# Patient Record
Sex: MALE | Race: WHITE | URBAN - METROPOLITAN AREA
[De-identification: names, ages, dates, MRNs, and addresses within clinical notes are randomized per-mention and may not be internally consistent; named-entity substitution may affect disease eponyms.]

---
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
## 2022-03-30 ENCOUNTER — TELEPHONE (OUTPATIENT)
Dept: FAMILY MEDICINE CLINIC | Facility: CLINIC | Age: 22
End: 2022-03-30

## 2022-03-31 NOTE — TELEPHONE ENCOUNTER
Pt returned nurse call. Pt made appt for 04/28/2022 since that was the only day and time that worked for him.

## 2022-03-31 NOTE — TELEPHONE ENCOUNTER
Please call for follow-up appointment this week for enterocolitis at [place]. Patient has not been seen since 2016. Verify with patient if not new PCP. Otherwise needs follow-up ER appointment.     Thank you

## 2022-04-19 PROBLEM — R11.2 NON-INTRACTABLE VOMITING WITH NAUSEA: Status: ACTIVE | Noted: 2017-03-06

## 2022-04-19 PROBLEM — R10.31 RIGHT LOWER QUADRANT ABDOMINAL PAIN: Status: ACTIVE | Noted: 2019-02-04

## 2022-04-19 PROBLEM — J06.9 VIRAL URI WITH COUGH: Status: ACTIVE | Noted: 2019-02-04

## 2022-04-19 PROBLEM — T07.XXXA MULTIPLE LACERATIONS: Status: ACTIVE | Noted: 2017-10-22

## 2022-04-19 PROBLEM — R10.33 PERIUMBILICAL ABDOMINAL PAIN: Status: ACTIVE | Noted: 2017-03-06

## 2022-04-28 ENCOUNTER — TELEPHONE (OUTPATIENT)
Dept: FAMILY MEDICINE CLINIC | Facility: CLINIC | Age: 22
End: 2022-04-28

## 2022-04-28 NOTE — TELEPHONE ENCOUNTER
LMOM for patient regarding NO SHOW status for office visit on 04/28/2022 with Dr. Ryan Quintanilla. I informed patient our office has a $99.81 NO SHOW FEE POLICY so we ask that you call 24 hours before your appt time. You can also use my-chart to cancel appt before 48 hours before your appointment. Patient will be charged $40.00 for any future NO SHOW appointments.

## (undated) NOTE — LETTER
March 14, 2019        [address] [facility]      Dear Penny Spaulding:    We have attempted to contact you by phone with no success.  In an effort to provide quality patient care we are reaching out to you to contact the office at your